# Patient Record
Sex: FEMALE | Race: WHITE | NOT HISPANIC OR LATINO | Employment: OTHER | URBAN - METROPOLITAN AREA
[De-identification: names, ages, dates, MRNs, and addresses within clinical notes are randomized per-mention and may not be internally consistent; named-entity substitution may affect disease eponyms.]

---

## 2019-08-22 ENCOUNTER — OFFICE VISIT (OUTPATIENT)
Dept: OPTOMETRY | Facility: CLINIC | Age: 64
End: 2019-08-22
Payer: COMMERCIAL

## 2019-08-22 DIAGNOSIS — H43.22 ASTEROID HYALOSIS OF LEFT EYE: ICD-10-CM

## 2019-08-22 DIAGNOSIS — H52.7 REFRACTIVE ERROR: ICD-10-CM

## 2019-08-22 DIAGNOSIS — H25.13 NUCLEAR SCLEROSIS OF BOTH EYES: Primary | ICD-10-CM

## 2019-08-22 PROCEDURE — 92015 PR REFRACTION: ICD-10-PCS | Mod: S$GLB,,, | Performed by: OPTOMETRIST

## 2019-08-22 PROCEDURE — 92004 COMPRE OPH EXAM NEW PT 1/>: CPT | Mod: S$GLB,,, | Performed by: OPTOMETRIST

## 2019-08-22 PROCEDURE — 92015 DETERMINE REFRACTIVE STATE: CPT | Mod: S$GLB,,, | Performed by: OPTOMETRIST

## 2019-08-22 PROCEDURE — 99999 PR PBB SHADOW E&M-NEW PATIENT-LVL II: CPT | Mod: PBBFAC,,, | Performed by: OPTOMETRIST

## 2019-08-22 PROCEDURE — 92004 PR EYE EXAM, NEW PATIENT,COMPREHESV: ICD-10-PCS | Mod: S$GLB,,, | Performed by: OPTOMETRIST

## 2019-08-22 PROCEDURE — 99999 PR PBB SHADOW E&M-NEW PATIENT-LVL II: ICD-10-PCS | Mod: PBBFAC,,, | Performed by: OPTOMETRIST

## 2019-08-22 RX ORDER — IBUPROFEN 200 MG
1 CAPSULE ORAL
COMMUNITY
End: 2020-02-27 | Stop reason: CLARIF

## 2019-08-22 RX ORDER — ATENOLOL 25 MG/1
25 TABLET ORAL DAILY
Refills: 0 | COMMUNITY
Start: 2019-06-10

## 2019-08-22 RX ORDER — OMEPRAZOLE 40 MG/1
40 CAPSULE, DELAYED RELEASE ORAL EVERY MORNING
Refills: 0 | COMMUNITY
Start: 2019-08-06 | End: 2020-02-27 | Stop reason: CLARIF

## 2019-08-22 RX ORDER — LORAZEPAM 1 MG/1
1 TABLET ORAL NIGHTLY PRN
Refills: 1 | COMMUNITY
Start: 2019-08-07

## 2019-08-22 RX ORDER — INFLIXIMAB 100 MG/10ML
INJECTION, POWDER, LYOPHILIZED, FOR SOLUTION INTRAVENOUS
Status: ON HOLD | COMMUNITY
End: 2020-03-11 | Stop reason: HOSPADM

## 2019-08-22 RX ORDER — PRAVASTATIN SODIUM 80 MG/1
80 TABLET ORAL NIGHTLY
Refills: 0 | COMMUNITY
Start: 2019-06-10

## 2019-08-22 RX ORDER — ZOLPIDEM TARTRATE 10 MG/1
10 TABLET ORAL NIGHTLY PRN
Refills: 1 | COMMUNITY
Start: 2019-08-07

## 2019-08-22 RX ORDER — DIPHENOXYLATE HYDROCHLORIDE AND ATROPINE SULFATE 2.5; .025 MG/1; MG/1
1 TABLET ORAL DAILY PRN
COMMUNITY

## 2019-08-22 RX ORDER — FENOFIBRATE 54 MG/1
54 TABLET ORAL 2 TIMES DAILY
Refills: 0 | COMMUNITY
Start: 2019-06-25

## 2019-08-22 RX ORDER — METFORMIN HYDROCHLORIDE 500 MG/1
1000 TABLET ORAL 2 TIMES DAILY WITH MEALS
COMMUNITY
Start: 2019-08-12

## 2019-08-22 RX ORDER — AZATHIOPRINE 50 MG/1
100 TABLET ORAL DAILY
COMMUNITY
Start: 2019-08-21

## 2019-08-22 RX ORDER — VENLAFAXINE HYDROCHLORIDE 150 MG/1
150 CAPSULE, EXTENDED RELEASE ORAL DAILY
Refills: 2 | COMMUNITY
Start: 2019-08-06

## 2019-08-22 NOTE — PROGRESS NOTES
HPI     New pt here for routine diabetic eye exam. Pt states last eye exam was   about 3 years ago. Pt has been noticing significant va changes since last   seen. She has noticed while reading she is struggling more. Denies eye   pain. Pt states high bp and diabetes are both controlled with meds. Pt has   noticed floaters in OU.     No results found for: HGBA1C      Last edited by García Banda on 8/22/2019  9:21 AM. (History)            Assessment /Plan     For exam results, see Encounter Report.    Nuclear sclerosis of both eyes    Asteroid hyalosis of left eye    Refractive error      1. Educated pt on presence of cataracts and effects on vision. No surgery at this time. Recheck in one year.  2. Monitor condition. Patient to report any changes. RTC 1 year recheck.  3. New Spec Rx given. Different lens options discussed with patient. RTC 1 year full exam.

## 2019-09-30 PROBLEM — K21.9 GERD (GASTROESOPHAGEAL REFLUX DISEASE): Status: ACTIVE | Noted: 2019-09-30

## 2020-02-11 DIAGNOSIS — M17.12 UNILATERAL PRIMARY OSTEOARTHRITIS, LEFT KNEE: Primary | ICD-10-CM

## 2020-02-13 ENCOUNTER — CLINICAL SUPPORT (OUTPATIENT)
Dept: REHABILITATION | Facility: HOSPITAL | Age: 65
End: 2020-02-13
Payer: COMMERCIAL

## 2020-02-13 DIAGNOSIS — M25.561 CHRONIC PAIN OF BOTH KNEES: ICD-10-CM

## 2020-02-13 DIAGNOSIS — R26.9 GAIT DIFFICULTY: ICD-10-CM

## 2020-02-13 DIAGNOSIS — G89.29 CHRONIC PAIN OF BOTH KNEES: ICD-10-CM

## 2020-02-13 DIAGNOSIS — M25.661 DECREASED RANGE OF MOTION OF BOTH KNEES: ICD-10-CM

## 2020-02-13 DIAGNOSIS — M17.12 UNILATERAL PRIMARY OSTEOARTHRITIS, LEFT KNEE: ICD-10-CM

## 2020-02-13 DIAGNOSIS — M25.662 DECREASED RANGE OF MOTION OF BOTH KNEES: ICD-10-CM

## 2020-02-13 DIAGNOSIS — M25.562 CHRONIC PAIN OF BOTH KNEES: ICD-10-CM

## 2020-02-13 PROCEDURE — 97162 PT EVAL MOD COMPLEX 30 MIN: CPT | Mod: PO

## 2020-02-13 PROCEDURE — 97110 THERAPEUTIC EXERCISES: CPT | Mod: PO

## 2020-02-13 NOTE — PLAN OF CARE
[facility]  Physical Therapy Initial Evaluation    Name: Dedra Mcclain  Clinic Number: [id number]    Therapy Diagnosis:   Encounter Diagnoses   Name Primary?    Unilateral primary osteoarthritis, left knee     Chronic pain of both knees     Decreased range of motion of both knees     Gait difficulty      Physician: LA Miramontes MD    Physician Orders: PT Eval and Treat  Medical Diagnosis from Referral: Unilateral primary osteoarthritis, left knee   Evaluation Date: 2/13/2020  Authorization Period Expiration: 12/31/2020   Plan of Care Expiration: 03/06/2020  Visit # / Visits authorized: 1/ 20    Time In: 1103  Time Out: 1156  Total Billable Time: 53 minutes    Precautions: Standard    Subjective   Date of onset: Chronic, scheduled for 3/10/2020  History of current condition - [place] reports: She reports she is having a (L) TKA scheduled for March 10th, 2020. She reports she has been having chronic knee pain for 15 years. She has had cortisone injections and 2 synvisc shots in the (R) and 1 in the (L). She reports her (L) knee is more painful than the (R), but the (R) knee is weaker. She reports one fall about 5 years ago when she tripped over the vacuum  cord. She states she has been furniture walking at home. (Instructe patient to use SPC). She reports her MD wants her to strengthen her LEs prior to surgery in March. [place] reports significant difficulty with stepping up onto the curb, stairs, and walking.      Medical History:   Past Medical History:   Diagnosis Date    Arthritis     Diabetes mellitus     Foot injury     Hypertension        Surgical History:   Dedra Mcclain  has a past surgical history that includes Gallbladder surgery; Hernia repair; Tubal ligation; Appendectomy; Lung biopsy; Esophagogastroduodenoscopy (N/A, 9/30/2019); and Esophageal dilation (N/A, 9/30/2019).    Medications:   [place]ia has a current medication list which includes the  following prescription(s): atenolol, azathioprine, calcium citrate, diphenoxylate-atropine 2.5-0.025 mg, fenofibrate, infliximab, levocetirizine, lorazepam, metformin, omeprazole, pravastatin, venlafaxine, and zolpidem.    Allergies:   Review of patient's allergies indicates:   Allergen Reactions    Morphine Hives and Itching        Imaging, X-rays: not in chart yet    Prior Therapy: Yes, for her shoulder. Never for her knee  Social History: Lives with her , SSH no steps, two walk in showers with a seat, has handicap bathroom set up   Occupation: Disability for Ulcerative Colitis, enjoys fishing, read  Prior Level of Function: Progressively worsening knee pain with ADLs, walking, standing, stairs  Current Level of Function: Cannot perform her ADLs, gardening, yardwork to the point where she is scheduling a (L) TKA    Pain:  Current 0/10, worst 10/10, best 0/10   Location: bilateral knee   Description: Aching and Burning  Aggravating Factors: Standing, Walking and Getting out of bed/chair  Easing Factors: rest and elevation    Pts goals: To strengthen legs pre-surgery     Objective     Observation: Patient in no acute distress    Posture: Stands with (B) increased knee flexion      Range of Motion:   Knee Left active Left Passive Right Active R passive   Flexion 116  NT   Extension -14 NT -5 NT       Lower Extremity Strength  Right LE  Left LE    Knee extension: 4+/5 Knee extension: 4+/5   Knee flexion: 4+/5 Knee flexion: 4+/5   Hip flexion: 4+/5 Hip flexion: 4+/5   Hip extension:  4+/5 Hip extension: 4/5   Hip abduction: 4/5 Hip abduction: 4-/5   Hip adduction: 4/5 Hip adduction 4/5   Ankle dorsiflexion: 4+/5 Ankle dorsiflexion: 4+/5   Ankle plantarflexion: 4+/5 Ankle plantarflexion: 4+/5           Special Tests: NT, patient scheduled for TKA and here for strengthening     Function:    - SLS R: 3s  - SLS L: 2s  - Squat: NT due to pain   - Sit <--> Stand:Independent   - Bed Mobility:  Independent        Joint Mobility: Hypomobile  Patellar (B)     Sensation: Intact to LT (B) LEs    Flexibility:   Ely's test: R = Mod limited ; L = Highly limited   Hamstring: R = Mod limited ; L = Mod limited      CMS Impairment/Limitation/Restriction for FOTO Knee Survey    Therapist reviewed FOTO scores for Dedra Mcclain on 2/13/2020.   FOTO documents entered into [employer] - see Media section.    Limitation Score: 68%  Category: Mobility         TREATMENT   Treatment Time In: 1135  Treatment Time Out: 1155  Total Treatment time separate from Evaluation: 11 minutes    [place] received therapeutic exercises to develop strength and ROM for 20 minutes including:  Quad set x 10 (3 second hold)  SLR x 10  SL hip abduction x 10  SL hip adduction x 10   Prone hip extension x 10   Clamshells GTB 2 x 10       Home Exercises and Patient Education Provided    Education provided:   - Role of PT, PT POC    Written Home Exercises Provided: yes.  Exercises were reviewed and [place] was able to demonstrate them prior to the end of the session.  [place]ia demonstrated good  understanding of the education provided.     See EMR under Media for exercises provided 2/13/2020.    Assessment   Virginia is a 64 y.o. female referred to outpatient Physical Therapy with a medical diagnosis of Unilateral primary osteoarthritis, left knee. Physical exam is consistent with (B) knee OA leading to decreased knee ROM and gait difficulty. Pt is scheduled for strengthening prior to knee replacement surgery. Primary impairments include AROM, PROM, joint mobility, strength, balance, soft tissue restrictions, gait, and pain which limits functional mobility. This pt is a good candidate for skilled PT tx and stands to benefit from a combination of manual therapy including joint mobilizations with trigger point/myofacscial release, therapeutic exercise to establish core/joint stability, neuromuscular re-education, and modalities Prn. The pt has been  educated on their dx/POC and consents to further PT tx.    Pt prognosis is Good.   Pt will benefit from skilled outpatient Physical Therapy to address the deficits stated above and in the chart below, provide pt/family education, and to maximize pt's level of independence.     Plan of care discussed with patient: Yes  Pt's spiritual, cultural and educational needs considered and patient is agreeable to the plan of care and goals as stated below:     Anticipated Barriers for therapy: Knee pain limiting ability to participate with exercises    Medical Necessity is demonstrated by the following  History  Co-morbidities and personal factors that may impact the plan of care Co-morbidities:   anxiety, diabetes, high BMI, HTN and ulcerative colitis    Personal Factors:   age  lifestyle     moderate   Examination  Body Structures and Functions, activity limitations and participation restrictions that may impact the plan of care Body Regions:   lower extremities    Body Systems:    ROM  strength  gross coordinated movement  balance  gait    Participation Restrictions:   Cannot exercise, pain with walking, ADLs    Activity limitations:   Learning and applying knowledge  no deficits    General Tasks and Commands  no deficits    Communication  no deficits    Mobility  lifting and carrying objects  walking    Self care  dressing    Domestic Life  shopping  doing house work (cleaning house, washing dishes, laundry)    Interactions/Relationships  no deficits    Life Areas  no deficits    Community and Social Life  community life  recreation and leisure         moderate   Clinical Presentation evolving clinical presentation with changing clinical characteristics moderate   Decision Making/ Complexity Score: moderate     Goals:  Short-Term Goals: 2 weeks  - The patient will be independent with initial home exercise program.  - The patient will increase strength to at least 4/5 to perform functional mobility including ambulation to  prepare for total knee replacement.    Long-Term Goals: 4 weeks  - Pt to achieve <53% limitation as measured by the FOTO to demonstrate decreased disability.  - The patient will increase strength to at least 4+/5 to perform functional mobility including ascending/descending stairs in preparation for total replacement.   - The patient will increase knee ROM grossly to 5 to 120 to perform ambulation with pain < 3/10.  - The patient will demo safe transfers and ambulation with RW in preparation for TKA.     Plan   Plan of care Certification: 2/13/2020 to 03/06/2020.    Outpatient Physical Therapy 2 times weekly for 3 weeks to include the following interventions: Gait Training, Manual Therapy, Moist Heat/ Ice, Neuromuscular Re-ed, Patient Education, Therapeutic Activites and Therapeutic Exercise.     Bar Hernandez, PT

## 2020-02-14 NOTE — PROGRESS NOTES
Physical Therapy Daily Treatment Note     Name: Dedra Mcclain  Clinic Number: [id number]    Therapy Diagnosis:   Encounter Diagnoses   Name Primary?    Chronic pain of both knees     Decreased range of motion of both knees     Gait difficulty      Physician: LA Miramontes MD    Visit Date: 2/17/2020  Physician Orders: PT Eval and Treat  Medical Diagnosis from Referral: Unilateral primary osteoarthritis, left knee   Evaluation Date: 2/13/2020  Authorization Period Expiration: 12/31/2020   Plan of Care Expiration: 03/06/2020  Visit # / Visits authorized: 2/ 20    Time In: 1108  Time Out: 1200  Total Billable Time: 26 minutes    Precautions: Standard    Subjective     Pt reports: Compliance with her HEP with no exacerbations of her pain.  She was compliant with home exercise program.  Response to previous treatment: No adverse effect  Functional change: Too soon to tell    Pain: 3/10  Location: left knee      Objective     [place] received therapeutic exercises to develop strength and ROM for 52 minutes including:  Upright bike x 5 min  Gastroc stretch on incline x 3 minutes  Quad set 2 x 10 (3 second hold)  SLR 2 x 10  SL hip abduction 2 x 10 1#  Prone hip extension 2 x 10 1#  Clamshells GTB 2 x 10     Supine hip flexor stretch with strap x 2 minutes  Leg Press 2 x 10 20#  LAQ 2 x 10 2#    Home Exercises Provided and Patient Education Provided     Education provided:   - Continuing HEP    Written Home Exercises Provided: yes.  Exercises were reviewed and [place] was able to demonstrate them prior to the end of the session.  [place]ia demonstrated good  understanding of the education provided.     See EMR under Media for exercises provided 2/17/2020.    Assessment     Virginia tolerated treatment very well with no increase in pain with strengthening exercises. She remains weak and fatigues quickly with her exercises and will continue to benefit from LE strengthening and ROM exercises prior to her knee  replacement.     Virginia is progressing well towards her goals.   Pt prognosis is Good.     Pt will continue to benefit from skilled outpatient physical therapy to address the deficits listed in the problem list box on initial evaluation, provide pt/family education and to maximize pt's level of independence in the home and community environment.     Pt's spiritual, cultural and educational needs considered and pt agreeable to plan of care and goals.    Anticipated barriers to physical therapy: None    Goals:   Short-Term Goals: 2 weeks  - The patient will be independent with initial home exercise program.  - The patient will increase strength to at least 4/5 to perform functional mobility including ambulation to prepare for total knee replacement.     Long-Term Goals: 4 weeks  - Pt to achieve <53% limitation as measured by the FOTO to demonstrate decreased disability.  - The patient will increase strength to at least 4+/5 to perform functional mobility including ascending/descending stairs in preparation for total replacement.   - The patient will increase knee ROM grossly to 5 to 120 to perform ambulation with pain < 3/10.  - The patient will demo safe transfers and ambulation with RW in preparation for TKA.     Plan     Continue with LE strengthening    Bar Hernandez, PT

## 2020-02-17 ENCOUNTER — CLINICAL SUPPORT (OUTPATIENT)
Dept: REHABILITATION | Facility: HOSPITAL | Age: 65
End: 2020-02-17
Payer: COMMERCIAL

## 2020-02-17 DIAGNOSIS — M25.661 DECREASED RANGE OF MOTION OF BOTH KNEES: ICD-10-CM

## 2020-02-17 DIAGNOSIS — M25.562 CHRONIC PAIN OF BOTH KNEES: ICD-10-CM

## 2020-02-17 DIAGNOSIS — M25.561 CHRONIC PAIN OF BOTH KNEES: ICD-10-CM

## 2020-02-17 DIAGNOSIS — M25.662 DECREASED RANGE OF MOTION OF BOTH KNEES: ICD-10-CM

## 2020-02-17 DIAGNOSIS — R26.9 GAIT DIFFICULTY: ICD-10-CM

## 2020-02-17 DIAGNOSIS — G89.29 CHRONIC PAIN OF BOTH KNEES: ICD-10-CM

## 2020-02-17 PROCEDURE — 97110 THERAPEUTIC EXERCISES: CPT | Mod: PO

## 2020-02-17 NOTE — PROGRESS NOTES
Physical Therapy Daily Treatment Note     Name: Dedra Mcclain  Clinic Number: [id number]    Therapy Diagnosis:   Encounter Diagnoses   Name Primary?    Chronic pain of both knees     Decreased range of motion of both knees     Gait difficulty      Physician: LA Miramontse MD    Visit Date: 2/20/2020  Physician Orders: PT Eval and Treat  Medical Diagnosis from Referral: Unilateral primary osteoarthritis, left knee   Evaluation Date: 2/13/2020  Authorization Period Expiration: 12/31/2020   Plan of Care Expiration: 03/06/2020  Visit # / Visits authorized: 3/ 20    Time In: 1158  Time Out: 1258  Total Billable Time: 60 minutes    Precautions: Standard    Subjective      Pt reports: She had increased muscle soreness following treatment earlier this week.   She was compliant with home exercise program.  Response to previous treatment: No adverse effect  Functional change: Too soon to tell    Pain: 3/10  Location: left knee      Objective     [place] received therapeutic exercises to develop strength and ROM for 60 minutes including:  Upright bike x 5 min  Gastroc stretch on incline x 3 minutes  Quad set 2 x 10 (3 second hold)  SLR 2 x 10 1#  SL hip abduction 2 x 10 1#  Prone hip extension 2 x 10 1#  Clamshells GTB 2 x 10     Supine hip flexor stretch with strap x 2 minutes  Leg Press 2 x 10 20#  LAQ 2 x 10 2#  HS curls 2 x 10 GTB    Standing hip abduction 2 x 10 YTB  Standing hip extension 2 x 10 YTB  Tandem stance 2 x 30s with (B) 1 finger support     Home Exercises Provided and Patient Education Provided     Education provided:   - Continuing HEP    Written Home Exercises Provided: yes.  Exercises were reviewed and [place] was able to demonstrate them prior to the end of the session.  [place]ia demonstrated good  understanding of the education provided.     See EMR under Media for exercises provided 2/17/2020.    Assessment     [place]ia continues to tolerate progression of LE strengthening exercises very well  with no exacerbations of knee pain. Fatigues quickly with standing hip exercises and requires UE support due to impaired balance. Appropriate challenge with tandem stance and UE 1 finger support. Will continue with LE strengthening prior to surgery.     [place]ia is progressing well towards her goals.   Pt prognosis is Good.     Pt will continue to benefit from skilled outpatient physical therapy to address the deficits listed in the problem list box on initial evaluation, provide pt/family education and to maximize pt's level of independence in the home and community environment.     Pt's spiritual, cultural and educational needs considered and pt agreeable to plan of care and goals.    Anticipated barriers to physical therapy: None    Goals:   Short-Term Goals: 2 weeks  - The patient will be independent with initial home exercise program.  - The patient will increase strength to at least 4/5 to perform functional mobility including ambulation to prepare for total knee replacement.     Long-Term Goals: 4 weeks  - Pt to achieve <53% limitation as measured by the FOTO to demonstrate decreased disability.  - The patient will increase strength to at least 4+/5 to perform functional mobility including ascending/descending stairs in preparation for total replacement.   - The patient will increase knee ROM grossly to 5 to 120 to perform ambulation with pain < 3/10.  - The patient will demo safe transfers and ambulation with RW in preparation for TKA.     Plan     Continue with LE strengthening    Bar Hernandez, PT

## 2020-02-20 ENCOUNTER — CLINICAL SUPPORT (OUTPATIENT)
Dept: REHABILITATION | Facility: HOSPITAL | Age: 65
End: 2020-02-20
Payer: COMMERCIAL

## 2020-02-20 DIAGNOSIS — M25.661 DECREASED RANGE OF MOTION OF BOTH KNEES: ICD-10-CM

## 2020-02-20 DIAGNOSIS — M25.562 CHRONIC PAIN OF BOTH KNEES: ICD-10-CM

## 2020-02-20 DIAGNOSIS — M25.662 DECREASED RANGE OF MOTION OF BOTH KNEES: ICD-10-CM

## 2020-02-20 DIAGNOSIS — G89.29 CHRONIC PAIN OF BOTH KNEES: ICD-10-CM

## 2020-02-20 DIAGNOSIS — R26.9 GAIT DIFFICULTY: ICD-10-CM

## 2020-02-20 DIAGNOSIS — M25.561 CHRONIC PAIN OF BOTH KNEES: ICD-10-CM

## 2020-02-20 PROCEDURE — 97110 THERAPEUTIC EXERCISES: CPT | Mod: PO

## 2020-02-24 ENCOUNTER — CLINICAL SUPPORT (OUTPATIENT)
Dept: REHABILITATION | Facility: HOSPITAL | Age: 65
End: 2020-02-24
Payer: COMMERCIAL

## 2020-02-24 DIAGNOSIS — M25.662 DECREASED RANGE OF MOTION OF BOTH KNEES: ICD-10-CM

## 2020-02-24 DIAGNOSIS — R26.9 GAIT DIFFICULTY: ICD-10-CM

## 2020-02-24 DIAGNOSIS — M25.661 DECREASED RANGE OF MOTION OF BOTH KNEES: ICD-10-CM

## 2020-02-24 DIAGNOSIS — M25.561 CHRONIC PAIN OF BOTH KNEES: ICD-10-CM

## 2020-02-24 DIAGNOSIS — M25.562 CHRONIC PAIN OF BOTH KNEES: ICD-10-CM

## 2020-02-24 DIAGNOSIS — G89.29 CHRONIC PAIN OF BOTH KNEES: ICD-10-CM

## 2020-02-24 PROCEDURE — 97110 THERAPEUTIC EXERCISES: CPT | Mod: PO

## 2020-02-24 NOTE — PROGRESS NOTES
Physical Therapy Daily Treatment Note     Name: Dedra Mcclain  Clinic Number: [id number]    Therapy Diagnosis:   Encounter Diagnoses   Name Primary?    Chronic pain of both knees     Decreased range of motion of both knees     Gait difficulty      Physician: LA Miramontes MD    Visit Date: 2/24/2020  Physician Orders: PT Eval and Treat  Medical Diagnosis from Referral: Unilateral primary osteoarthritis, left knee   Evaluation Date: 2/13/2020  Authorization Period Expiration: 12/31/2020   Plan of Care Expiration: 03/06/2020  Visit # / Visits authorized: 4/ 20    Time In: 1200  Time Out: 1255  Total Billable Time: 27 minutes    Precautions: Standard    Subjective      Pt reports: She had lateral quad pain after her visit last week.   She was compliant with home exercise program.  Response to previous treatment: No adverse effect  Functional change: Too soon to tell    Pain: 3/10  Location: left knee      Objective     BP taken seated (L) after exercises: 149/97    [place] received therapeutic exercises to develop strength and ROM for 55 minutes including:  Upright bike x 5 min  Gastroc stretch on incline x 3 minutes  Quad set 2 x 10 (3 second hold)  SLR 2 x 10 1#  SL hip abduction 2 x 10 1#  Prone hip extension 2 x 10 1#  Clamshells GTB 2 x 10     Supine hip flexor stretch with strap x 2 minutes  Leg Press 2 x 10 20#  LAQ 2 x 10 2#  HS curls 2 x 10 GTB    Standing hip abduction 2 x 10 YTB - NP  Standing hip extension 2 x 10 YTB - NP  Tandem stance 2 x 30s with (B) 1 finger support - NP    Home Exercises Provided and Patient Education Provided     Education provided:   - Continuing HEP    Written Home Exercises Provided: yes.  Exercises were reviewed and [place] was able to demonstrate them prior to the end of the session.  [place] demonstrated good  understanding of the education provided.     See EMR under Media for exercises provided 2/17/2020.    Assessment     [place]ia reported lateral leg soreness  and dizziness with treatment today. BP was 149/97 while sitting EOM and patient's symptoms of dizziness resolved after prolonged sitting. Held standing exercises due to her elevated BP. Will continue to progress as tolerated.     [place]ia is progressing well towards her goals.   Pt prognosis is Good.     Pt will continue to benefit from skilled outpatient physical therapy to address the deficits listed in the problem list box on initial evaluation, provide pt/family education and to maximize pt's level of independence in the home and community environment.     Pt's spiritual, cultural and educational needs considered and pt agreeable to plan of care and goals.    Anticipated barriers to physical therapy: None    Goals:   Short-Term Goals: 2 weeks  - The patient will be independent with initial home exercise program.  - The patient will increase strength to at least 4/5 to perform functional mobility including ambulation to prepare for total knee replacement.     Long-Term Goals: 4 weeks  - Pt to achieve <53% limitation as measured by the FOTO to demonstrate decreased disability.  - The patient will increase strength to at least 4+/5 to perform functional mobility including ascending/descending stairs in preparation for total replacement.   - The patient will increase knee ROM grossly to 5 to 120 to perform ambulation with pain < 3/10.  - The patient will demo safe transfers and ambulation with RW in preparation for TKA.     Plan     Continue with LE strengthening    Bar Hernandez, PT

## 2020-02-27 ENCOUNTER — CLINICAL SUPPORT (OUTPATIENT)
Dept: REHABILITATION | Facility: HOSPITAL | Age: 65
End: 2020-02-27
Payer: COMMERCIAL

## 2020-02-27 DIAGNOSIS — M25.562 CHRONIC PAIN OF BOTH KNEES: ICD-10-CM

## 2020-02-27 DIAGNOSIS — M25.561 CHRONIC PAIN OF BOTH KNEES: ICD-10-CM

## 2020-02-27 DIAGNOSIS — M25.661 DECREASED RANGE OF MOTION OF BOTH KNEES: ICD-10-CM

## 2020-02-27 DIAGNOSIS — R26.9 GAIT DIFFICULTY: ICD-10-CM

## 2020-02-27 DIAGNOSIS — G89.29 CHRONIC PAIN OF BOTH KNEES: ICD-10-CM

## 2020-02-27 DIAGNOSIS — M25.662 DECREASED RANGE OF MOTION OF BOTH KNEES: ICD-10-CM

## 2020-02-27 PROCEDURE — 97140 MANUAL THERAPY 1/> REGIONS: CPT | Mod: PO

## 2020-02-27 PROCEDURE — 97110 THERAPEUTIC EXERCISES: CPT | Mod: PO

## 2020-02-27 NOTE — PROGRESS NOTES
Physical Therapy Daily Treatment Note     Name: Dedra Mcclain  Clinic Number: [id number]    Therapy Diagnosis:   Encounter Diagnoses   Name Primary?    Chronic pain of both knees     Decreased range of motion of both knees     Gait difficulty      Physician: LA Miramontes MD    Visit Date: 2/27/2020  Physician Orders: PT Eval and Treat  Medical Diagnosis from Referral: Unilateral primary osteoarthritis, left knee   Evaluation Date: 2/13/2020  Authorization Period Expiration: 12/31/2020   Plan of Care Expiration: 03/06/2020  Visit # / Visits authorized: 5/ 20    Time In: 1500  Time Out: 1600  Total Billable Time: 60 minutes    Precautions: Standard    Subjective      Pt reports: She stated her insurance might not approve her surgery, which has her frustrated. She reports she is still having lateral quad soreness.  She was compliant with home exercise program.  Response to previous treatment: No adverse effect  Functional change: Too soon to tell    Pain: 3/10  Location: left knee      Objective       [place] received therapeutic exercises to develop strength and ROM for 50 minutes including:  Upright bike x 5 min  Gastroc stretch on incline x 3 minutes  Quad set 2 x 10 (3 second hold)  SLR 2 x 10 1#  SL hip abduction 2 x 10 1#  Prone hip extension 2 x 10 1#  Clamshells GTB 2 x 10     Supine hip flexor stretch with strap x 2 minutes  Leg Press 2 x 10 20#  LAQ 2 x 10 2#  HS curls 2 x 10 GTB    Standing hip abduction 2 x 10 YTB  Standing hip extension 2 x 10 YTB   Tandem stance 2 x 30s with (B) 1 finger support     [place] received the following manual therapy techniques: Soft tissue Mobilization were applied to the: (R) lateral quad for 10 minutes, including:  IASTM to (R) VL and ITB       Home Exercises Provided and Patient Education Provided     Education provided:   - Continuing HEP    Written Home Exercises Provided: yes.  Exercises were reviewed and [place] was able to demonstrate them prior to the  end of the session.  [place]ia demonstrated good  understanding of the education provided.     See EMR under Media for exercises provided 2/17/2020.    Assessment     [place]ia performed all exercises with no symptoms of dizziness or lightheadedness. She is continuing to progress with her hip and quad strengthening in preparation for her surgery on 3/10/2020. Good response to IASTM to (R) quad today with appropriate erythema and less pain following treatment.     [place]ia is progressing well towards her goals.   Pt prognosis is Good.     Pt will continue to benefit from skilled outpatient physical therapy to address the deficits listed in the problem list box on initial evaluation, provide pt/family education and to maximize pt's level of independence in the home and community environment.     Pt's spiritual, cultural and educational needs considered and pt agreeable to plan of care and goals.    Anticipated barriers to physical therapy: None    Goals:   Short-Term Goals: 2 weeks  - The patient will be independent with initial home exercise program.  - The patient will increase strength to at least 4/5 to perform functional mobility including ambulation to prepare for total knee replacement.     Long-Term Goals: 4 weeks  - Pt to achieve <53% limitation as measured by the FOTO to demonstrate decreased disability.  - The patient will increase strength to at least 4+/5 to perform functional mobility including ascending/descending stairs in preparation for total replacement.   - The patient will increase knee ROM grossly to 5 to 120 to perform ambulation with pain < 3/10.  - The patient will demo safe transfers and ambulation with RW in preparation for TKA.     Plan     Continue with LE strengthening    Bar Hernandez, PT

## 2020-02-28 ENCOUNTER — HOSPITAL ENCOUNTER (OUTPATIENT)
Dept: RADIOLOGY | Facility: HOSPITAL | Age: 65
Discharge: HOME | End: 2020-02-28
Attending: SURGERY
Payer: COMMERCIAL

## 2020-02-28 ENCOUNTER — HOSPITAL ENCOUNTER (OUTPATIENT)
Dept: RADIOLOGY | Facility: HOSPITAL | Age: 65
Discharge: HOME | End: 2020-02-28
Attending: SURGERY
Payer: MEDICARE

## 2020-02-28 DIAGNOSIS — M81.0 OSTEOPOROSIS: ICD-10-CM

## 2020-02-28 DIAGNOSIS — Z12.31 VISIT FOR SCREENING MAMMOGRAM: ICD-10-CM

## 2020-02-28 PROCEDURE — 77067 SCR MAMMO BI INCL CAD: CPT | Mod: TC,PO

## 2020-02-28 PROCEDURE — 77080 DXA BONE DENSITY AXIAL: CPT | Mod: TC,PO

## 2020-02-28 PROCEDURE — 77067 MAMMO DIGITAL SCREENING BILAT WITH TOMOSYNTHESIS_CAD: ICD-10-PCS | Mod: 26,,, | Performed by: RADIOLOGY

## 2020-02-28 PROCEDURE — 77063 MAMMO DIGITAL SCREENING BILAT WITH TOMOSYNTHESIS_CAD: ICD-10-PCS | Mod: 26,,, | Performed by: RADIOLOGY

## 2020-02-28 PROCEDURE — 77080 DXA BONE DENSITY AXIAL: CPT | Mod: 26,,, | Performed by: RADIOLOGY

## 2020-02-28 PROCEDURE — 77067 SCR MAMMO BI INCL CAD: CPT | Mod: 26,,, | Performed by: RADIOLOGY

## 2020-02-28 PROCEDURE — 77063 BREAST TOMOSYNTHESIS BI: CPT | Mod: 26,,, | Performed by: RADIOLOGY

## 2020-02-28 PROCEDURE — 77080 DEXA BONE DENSITY SPINE HIP: ICD-10-PCS | Mod: 26,,, | Performed by: RADIOLOGY

## 2020-02-28 NOTE — PROGRESS NOTES
Physical Therapy Daily Treatment Note     Name: Dedra Mcclain  Clinic Number: [id number]    Therapy Diagnosis:   Encounter Diagnoses   Name Primary?    Chronic pain of both knees     Decreased range of motion of both knees     Gait difficulty      Physician: LA Miramontes MD    Visit Date: 3/2/2020  Physician Orders: PT Eval and Treat  Medical Diagnosis from Referral: Unilateral primary osteoarthritis, left knee   Evaluation Date: 2/13/2020  Authorization Period Expiration: 12/31/2020   Plan of Care Expiration: 03/06/2020  Visit # / Visits authorized: 6/ 20    Time In: 1200  Time Out: 1258  Total Billable Time: 58 minutes    Precautions: Standard    Subjective      Pt reports: She stated her insurance might not approve her surgery, which has her frustrated. She reports she is still having lateral quad soreness.  She was compliant with home exercise program.  Response to previous treatment: No adverse effect  Functional change: Too soon to tell    Pain: 3/10  Location: left knee      Objective       Virginia received therapeutic exercises to develop strength and ROM for 50 minutes including:  Upright bike x 5 min  Gastroc stretch on incline x 3 minutes  Quad set 2 x 10 (3 second hold)  SLR 2 x 10 2#  SL hip abduction 2 x 10 2#  Prone hip extension 2 x 10 2#  Clamshells BTB 2 x 10     Supine hip flexor stretch with strap x 2 minutes  Leg Press 2 x 10 30#  LAQ 2 x 10 2#  HS curls 2 x 10 BTB    Standing hip abduction 2 x 10 YTB  Standing hip extension 2 x 10 YTB   Tandem stance 2 x 30s with (B) 1 finger support     [place]ia received the following manual therapy techniques: Soft tissue Mobilization were applied to the: (R) lateral quad for 8 minutes, including:  IASTM to (R) VL and ITB       Home Exercises Provided and Patient Education Provided     Education provided:   - Continuing HEP    Written Home Exercises Provided: yes.  Exercises were reviewed and [place] was able to demonstrate them prior to the  end of the session.  Virginia demonstrated good  understanding of the education provided.     See EMR under Media for exercises provided 2/17/2020.    Assessment     Virginia had increased TTP and soreness at (R) VL and ITB, which improved following IASTM. Progressed with her resistance for LE exercises, which she tolerated well with no exacerbations of pain. Will see for one more visit prior to her knee surgery next week.      Virginia is progressing well towards her goals.   Pt prognosis is Good.     Pt will continue to benefit from skilled outpatient physical therapy to address the deficits listed in the problem list box on initial evaluation, provide pt/family education and to maximize pt's level of independence in the home and community environment.     Pt's spiritual, cultural and educational needs considered and pt agreeable to plan of care and goals.    Anticipated barriers to physical therapy: None    Goals:   Short-Term Goals: 2 weeks  - The patient will be independent with initial home exercise program.  - The patient will increase strength to at least 4/5 to perform functional mobility including ambulation to prepare for total knee replacement.     Long-Term Goals: 4 weeks  - Pt to achieve <53% limitation as measured by the FOTO to demonstrate decreased disability.  - The patient will increase strength to at least 4+/5 to perform functional mobility including ascending/descending stairs in preparation for total replacement.   - The patient will increase knee ROM grossly to 5 to 120 to perform ambulation with pain < 3/10.  - The patient will demo safe transfers and ambulation with RW in preparation for TKA.     Plan     Continue with LE strengthening    Bar Hernandez, PT

## 2020-03-02 ENCOUNTER — CLINICAL SUPPORT (OUTPATIENT)
Dept: REHABILITATION | Facility: HOSPITAL | Age: 65
End: 2020-03-02
Payer: COMMERCIAL

## 2020-03-02 DIAGNOSIS — M25.661 DECREASED RANGE OF MOTION OF BOTH KNEES: ICD-10-CM

## 2020-03-02 DIAGNOSIS — G89.29 CHRONIC PAIN OF BOTH KNEES: ICD-10-CM

## 2020-03-02 DIAGNOSIS — R26.9 GAIT DIFFICULTY: ICD-10-CM

## 2020-03-02 DIAGNOSIS — M25.562 CHRONIC PAIN OF BOTH KNEES: ICD-10-CM

## 2020-03-02 DIAGNOSIS — M25.561 CHRONIC PAIN OF BOTH KNEES: ICD-10-CM

## 2020-03-02 DIAGNOSIS — M25.662 DECREASED RANGE OF MOTION OF BOTH KNEES: ICD-10-CM

## 2020-03-02 PROCEDURE — 97110 THERAPEUTIC EXERCISES: CPT | Mod: PO

## 2020-03-03 NOTE — PROGRESS NOTES
Physical Therapy Daily Treatment Note     Name: Dedra Mcclain  Clinic Number: [id number]    Therapy Diagnosis:   Encounter Diagnoses   Name Primary?    Chronic pain of both knees     Decreased range of motion of both knees     Gait difficulty      Physician: LA Miramontes MD    Visit Date: 3/6/2020  Physician Orders: PT Eval and Treat  Medical Diagnosis from Referral: Unilateral primary osteoarthritis, left knee   Evaluation Date: 2/13/2020  Authorization Period Expiration: 12/31/2020   Plan of Care Expiration: 03/06/2020  Visit # / Visits authorized: 7/ 20    Time In: 1100  Time Out: 1149  Total Billable Time: 49 minutes    Precautions: Standard    Subjective      Pt reports: She has done a lot of walking this week and has been having increased low back and knee pain. Requests less resistance with exercises due to muscle soreness.   She was compliant with home exercise program.  Response to previous treatment: No adverse effect  Functional change: Too soon to tell    Pain: 3/10  Location: left knee      Objective       [place]ia received therapeutic exercises to develop strength and ROM for 50 minutes including:  Upright bike x 5 min  Gastroc stretch on incline x 3 minutes  Quad set 2 x 10 (3 second hold)  SLR 2 x 10   SL hip abduction 2 x 10   Prone hip extension 2 x 10  Clamshells BTB 2 x 10     Supine hip flexor stretch with strap x 2 minutes  Leg Press 2 x 10 30#  LAQ 2 x 10  HS curls 2 x 10 BTB    Standing hip abduction 2 x 10 YTB  Standing hip extension 2 x 10 YTB   Tandem stance 2 x 30s with (B) 1 finger support     [place]ia participated in dynamic functional therapeutic activities to improve functional performance for 4 minutes, including:  RW training for transfers/gait         Home Exercises Provided and Patient Education Provided     Education provided:   - Continuing HEP    Written Home Exercises Provided: yes.  Exercises were reviewed and [place] was able to demonstrate them prior to the  end of the session.  [place]ia demonstrated good  understanding of the education provided.     See EMR under Media for exercises provided 2/17/2020.    Assessment     Nichole has progressed well with her knee ROM and LE strength prior to her (L) TKA. Reduced resistance due to increased knee soreness today and instructed patient on safety with RW use for transfers and ambulation. She is discharged from PT.     Virginia is progressing well towards her goals.   Pt prognosis is Good.     Pt will continue to benefit from skilled outpatient physical therapy to address the deficits listed in the problem list box on initial evaluation, provide pt/family education and to maximize pt's level of independence in the home and community environment.     Pt's spiritual, cultural and educational needs considered and pt agreeable to plan of care and goals.    Anticipated barriers to physical therapy: None    Goals:   Short-Term Goals: 2 weeks  - The patient will be independent with initial home exercise program.(Met)  - The patient will increase strength to at least 4/5 to perform functional mobility including ambulation to prepare for total knee replacement. (Met)    Long-Term Goals: 4 weeks  - Pt to achieve <53% limitation as measured by the FOTO to demonstrate decreased disability. (Progressing, not met)  - The patient will increase strength to at least 4+/5 to perform functional mobility including ascending/descending stairs in preparation for total replacement. (Progressing, not met)  - The patient will increase knee ROM grossly to 5 to 120 to perform ambulation with pain < 3/10. (Progressing, not met)   - The patient will demo safe transfers and ambulation with RW in preparation for TKA. (Met)      Plan     Discharge from PT    Bar Hernandez, PT

## 2020-03-06 ENCOUNTER — CLINICAL SUPPORT (OUTPATIENT)
Dept: REHABILITATION | Facility: HOSPITAL | Age: 65
End: 2020-03-06
Payer: COMMERCIAL

## 2020-03-06 DIAGNOSIS — G89.29 CHRONIC PAIN OF BOTH KNEES: ICD-10-CM

## 2020-03-06 DIAGNOSIS — M25.662 DECREASED RANGE OF MOTION OF BOTH KNEES: ICD-10-CM

## 2020-03-06 DIAGNOSIS — R26.9 GAIT DIFFICULTY: ICD-10-CM

## 2020-03-06 DIAGNOSIS — M25.562 CHRONIC PAIN OF BOTH KNEES: ICD-10-CM

## 2020-03-06 DIAGNOSIS — M25.661 DECREASED RANGE OF MOTION OF BOTH KNEES: ICD-10-CM

## 2020-03-06 DIAGNOSIS — M25.561 CHRONIC PAIN OF BOTH KNEES: ICD-10-CM

## 2020-03-06 PROCEDURE — 97110 THERAPEUTIC EXERCISES: CPT | Mod: PO

## 2020-03-06 NOTE — PLAN OF CARE
[facility]  Physical Therapy Reassessment    Name: Dedra Mcclain  Clinic Number: [id number]    Therapy Diagnosis:   Encounter Diagnoses   Name Primary?    Chronic pain of both knees     Decreased range of motion of both knees     Gait difficulty      Physician: LA Miramontes MD    Visit Date: 3/6/2020  Physician Orders: PT Eval and Treat  Medical Diagnosis from Referral: Unilateral primary osteoarthritis, left knee   Evaluation Date: 2/13/2020  Authorization Period Expiration: 12/31/2020   Plan of Care Expiration: 03/06/2020  Visit # / Visits authorized: 7/ 20    Precautions: Standard    Subjective   Date of onset: Chronic, scheduled for 3/10/2020  History of current condition - [place] reports: She reports she is having a (L) TKA scheduled for March 10th, 2020. She reports she has been having chronic knee pain for 15 years. She has had cortisone injections and 2 synvisc shots in the (R) and 1 in the (L). She reports her (L) knee is more painful than the (R), but the (R) knee is weaker. She reports one fall about 5 years ago when she tripped over the vacuum  cord. She states she has been furniture walking at home. (Instructe patient to use SPC). She reports her MD wants her to strengthen her LEs prior to surgery in March. Virginia reports significant difficulty with stepping up onto the curb, stairs, and walking.     Reassessment: Virginia reports she thinks her knee is straighter and stronger since beginning PT. She states she is glad she came to therapy prior to surgery.      Medical History:   Past Medical History:   Diagnosis Date    Arthritis     Diabetes mellitus     Foot injury     Hypertension        Surgical History:   Dedra Mcclain  has a past surgical history that includes Gallbladder surgery; Tubal ligation; Lung biopsy; Esophagogastroduodenoscopy (N/A, 9/30/2019); Esophageal dilation (N/A, 9/30/2019); Hernia repair (2016); and Appendectomy  (2007).    Medications:   Virginia has a current medication list which includes the following prescription(s): aspirin, atenolol, azathioprine, chlorhexidine, diphenoxylate-atropine 2.5-0.025 mg, fenofibrate, infliximab, lorazepam, metformin, mupirocin, pantoprazole, pravastatin, venlafaxine, and zolpidem.    Allergies:   Review of patient's allergies indicates:   Allergen Reactions    Morphine Hives and Itching        Imaging, X-rays: not in chart yet    Prior Therapy: Yes, for her shoulder. Never for her knee  Social History: Lives with her , H no steps, two walk in showers with a seat, has handicap bathroom set up   Occupation: Disability for Ulcerative Colitis, enjoys fishing, read  Prior Level of Function: Progressively worsening knee pain with ADLs, walking, standing, stairs  Current Level of Function: Cannot perform her ADLs, gardening, yardwork to the point where she is scheduling a (L) TKA    Pain:  Current 0/10, worst 10/10, best 0/10   Location: bilateral knee   Description: Aching and Burning  Aggravating Factors: Standing, Walking and Getting out of bed/chair  Easing Factors: rest and elevation    Pts goals: To strengthen legs pre-surgery     Objective     Observation: Patient in no acute distress    Posture: Stands with (B) increased knee flexion      Range of Motion:   Knee Left active Left Passive Right Active R passive   Flexion 116  NT   Extension -7 NT -2 NT       Lower Extremity Strength  Right LE  Left LE    Knee extension: 4+/5* Knee extension: 5/5   Knee flexion: 5/5 Knee flexion: 5/5   Hip flexion: 5/5 Hip flexion: 5/5   Hip extension:  4+/5 Hip extension: 4+/5   Hip abduction: 4/5 Hip abduction: 4+/5   Hip adduction: 4/5 Hip adduction 4/5   Ankle dorsiflexion: 4+/5 Ankle dorsiflexion: 4+/5   Ankle plantarflexion: 4+/5 Ankle plantarflexion: 4+/5           Special Tests: NT, patient scheduled for TKA and here for strengthening     Function:    - SLS R: 3s  - SLS L: 2s  -  Squat: NT due to pain   - Sit <--> Stand:Independent   - Bed Mobility: Independent        Joint Mobility: Hypomobile  Patellar (B)     Sensation: Intact to LT (B) LEs    Flexibility:   Ely's test: R = Mod limited ; L = Highly limited   Hamstring: R = Mod limited ; L = Mod limited      CMS Impairment/Limitation/Restriction for FOTO Knee Survey    Therapist reviewed FOTO scores for Dedra Mcclain on 3/6/2020.   FOTO documents entered into [employer] - see Media section.    Limitation Score: 68%  Category: Mobility         TREATMENT   See Daily Note     Assessment   Virginia is a 64 y.o. female referred to outpatient Physical Therapy with a medical diagnosis of Unilateral primary osteoarthritis, left knee. Physical exam is consistent with (B) knee OA leading to decreased knee ROM and gait difficulty. Pt is scheduled for strengthening prior to knee replacement surgery. Since beginning PT, Virginia has been seen 7 times since initial evaluation on 02/13/2020. Overall, Virginia has made good, steady progress with her PT treatments and has worked hard towards all of her PT goals as evidenced by subjective and objective improvements. Good improvements with knee ROM and LE strength, but still limited with knee extension and LE strength. She is scheduled for (L) knee TKA next week and is discharged from PT.       Pt prognosis is Good.   Pt will benefit from skilled outpatient Physical Therapy to address the deficits stated above and in the chart below, provide pt/family education, and to maximize pt's level of independence.     Plan of care discussed with patient: Yes  Pt's spiritual, cultural and educational needs considered and patient is agreeable to the plan of care and goals as stated below:     Anticipated Barriers for therapy: Knee pain limiting ability to participate with exercises    Medical Necessity is demonstrated by the following  History  Co-morbidities and personal factors that may impact the plan of care  Co-morbidities:   anxiety, diabetes, high BMI, HTN and ulcerative colitis    Personal Factors:   age  lifestyle     moderate   Examination  Body Structures and Functions, activity limitations and participation restrictions that may impact the plan of care Body Regions:   lower extremities    Body Systems:    ROM  strength  gross coordinated movement  balance  gait    Participation Restrictions:   Cannot exercise, pain with walking, ADLs    Activity limitations:   Learning and applying knowledge  no deficits    General Tasks and Commands  no deficits    Communication  no deficits    Mobility  lifting and carrying objects  walking    Self care  dressing    Domestic Life  shopping  doing house work (cleaning house, washing dishes, laundry)    Interactions/Relationships  no deficits    Life Areas  no deficits    Community and Social Life  community life  recreation and leisure         moderate   Clinical Presentation evolving clinical presentation with changing clinical characteristics moderate   Decision Making/ Complexity Score: moderate     Goals:  Short-Term Goals: 2 weeks  - The patient will be independent with initial home exercise program.(Met)  - The patient will increase strength to at least 4/5 to perform functional mobility including ambulation to prepare for total knee replacement. (Met)    Long-Term Goals: 4 weeks  - Pt to achieve <53% limitation as measured by the FOTO to demonstrate decreased disability. (Progressing, not met)  - The patient will increase strength to at least 4+/5 to perform functional mobility including ascending/descending stairs in preparation for total replacement. (Progressing, not met)  - The patient will increase knee ROM grossly to 5 to 120 to perform ambulation with pain < 3/10. (Progressing, not met)   - The patient will demo safe transfers and ambulation with RW in preparation for TKA. (Met)    Plan   Discharge from PT     Bar Hernandez, PT

## 2020-03-10 PROBLEM — M17.12 PRIMARY OSTEOARTHRITIS OF LEFT KNEE: Status: ACTIVE | Noted: 2020-03-10

## 2020-03-11 PROBLEM — E66.01 CLASS 3 SEVERE OBESITY WITH BODY MASS INDEX (BMI) OF 40.0 TO 44.9 IN ADULT: Status: ACTIVE | Noted: 2020-03-11

## 2020-03-30 ENCOUNTER — CLINICAL SUPPORT (OUTPATIENT)
Dept: REHABILITATION | Facility: HOSPITAL | Age: 65
End: 2020-03-30
Payer: COMMERCIAL

## 2020-03-30 DIAGNOSIS — R29.898 WEAKNESS OF BOTH LOWER EXTREMITIES: ICD-10-CM

## 2020-03-30 DIAGNOSIS — R26.9 GAIT DIFFICULTY: ICD-10-CM

## 2020-03-30 DIAGNOSIS — M25.662 DECREASED RANGE OF MOTION OF LEFT KNEE: ICD-10-CM

## 2020-03-30 PROBLEM — M25.661 DECREASED RANGE OF MOTION OF BOTH KNEES: Status: RESOLVED | Noted: 2020-02-13 | Resolved: 2020-03-30

## 2020-03-30 PROBLEM — G89.29 CHRONIC PAIN OF BOTH KNEES: Status: RESOLVED | Noted: 2020-02-13 | Resolved: 2020-03-30

## 2020-03-30 PROBLEM — M25.561 CHRONIC PAIN OF BOTH KNEES: Status: RESOLVED | Noted: 2020-02-13 | Resolved: 2020-03-30

## 2020-03-30 PROBLEM — M25.562 CHRONIC PAIN OF BOTH KNEES: Status: RESOLVED | Noted: 2020-02-13 | Resolved: 2020-03-30

## 2020-03-30 PROCEDURE — 97110 THERAPEUTIC EXERCISES: CPT | Mod: PO

## 2020-03-30 PROCEDURE — 97161 PT EVAL LOW COMPLEX 20 MIN: CPT | Mod: PO

## 2020-03-30 NOTE — PLAN OF CARE
"[facility]  Physical Therapy Initial Evaluation    Name: Dedra Mcclain  Clinic Number: [id number]    Therapy Diagnosis:   Encounter Diagnoses   Name Primary?    Gait difficulty     Decreased range of motion of left knee     Weakness of both lower extremities      Physician: LA Miramontes MD    Physician Orders: PT Eval and Treat   Medical Diagnosis from Referral: Primary osteoarthritis of left knee   Evaluation Date: 3/30/2020  Authorization Period Expiration: 02/26/2021  Plan of Care Expiration: 05/29/2020  Visit # / Visits authorized: 1/ 1    Time In: 1050  Time Out: 1145  Total Billable Time: 55 minutes    Precautions: Standard    Subjective   Date of onset: s/p Left total knee arthroplasty.  DATE OF PROCEDURE: 03/10/2020   History of current condition - [place] reports: She underwent a (L) TKA on 03/10/2020. She has progressed to the quad cane and is confident with her ambulation. She reports on fall the day after she got home while in the bathroom, but no instability with ambulation. She finished home health PT last Monday, but reports she has not been doing her HEP due to laziness. She reports she has minimal pain with walking and standing, but reports difficulty with sleeping. She states she is down to 1 pain pill per day. "I dont' think I can do this while wearing the mask."     Medical History:   Past Medical History:   Diagnosis Date    Arthritis     Diabetes mellitus     Foot injury     Hypertension     PONV (postoperative nausea and vomiting)     Ulcerative colitis        Surgical History:   Dedra Mcclain  has a past surgical history that includes Gallbladder surgery; Tubal ligation; Lung biopsy; Esophagogastroduodenoscopy (N/A, 9/30/2019); Esophageal dilation (N/A, 9/30/2019); Hernia repair (2016); Appendectomy (2007); and Knee Arthroplasty (Left, 3/10/2020).    Medications:   Virginia has a current medication list which includes the following " prescription(s): aspirin, atenolol, azathioprine, diphenoxylate-atropine 2.5-0.025 mg, fenofibrate, lorazepam, metformin, ondansetron, oxycodone, pantoprazole, pravastatin, tramadol, venlafaxine, and zolpidem.    Allergies:   Review of patient's allergies indicates:   Allergen Reactions    Morphine Hives and Itching        Imaging, Expected postoperative changes from left knee arthroplasty     Prior Therapy: Yes, prehab prior to (L) TKA  Social History: Lives with her , H no steps, two walk in showers with a seat, has handicap bathroom set up   Occupation: Disability for Ulcerative Colitis, enjoys fishing, read  Prior Level of Function: Progressively worsening knee pain with ADLs, walking, standing, stairs  Current Level of Function: Pain with sleeping, standing, needs quad cane to walk    Pain:  Current 3/10, worst 7/10, best 1/10   Location: left knee   Description: Aching, Burning and Deep  Aggravating Factors: Sitting, Standing, Walking, Night Time and Getting out of bed/chair  Easing Factors: massage and pain medication    Pts goals: To be able to walk, to be pain free at time    Objective     Observation: Ambulates with quad cane. Incision healing well    Posture: No significant deficits noted     Range of Motion:   Knee Left active Left Passive Right Active R passive   Flexion 104  NT   Extension 4 NT 5 NT       Lower Extremity Strength  Right LE  Left LE    Knee extension: 4+/5 Knee extension: 4+/5   Knee flexion: 4-/5 Knee flexion: 4-/5   Hip flexion: 4+/5 Hip flexion: 4-/5   Hip extension:  NT Hip extension: NT   Hip abduction: 4/5 Hip abduction: 4-/5   Hip adduction: NT Hip adduction NT   Ankle dorsiflexion: 5/5 Ankle dorsiflexion: 5/5   Ankle plantarflexion: 5/5 Ankle plantarflexion: 5/5       Special Tests: NT s/p (L) TKA       Function:  - Sit <--> Stand: Independent   - Bed Mobility: Independent       Joint Mobility: Mild Hypomobility (L)  Patellar    Sensation: Diminished (L) lateral  knee to LT       CMS Impairment/Limitation/Restriction for FOTO Knee Survey    Therapist reviewed FOTO scores for Dedra Mcclain on 3/30/2020.   FOTO documents entered into [employer] - see Media section.    Limitation Score: 56%  Category: Mobility         TREATMENT   Treatment Time In: 1123  Treatment Time Out: 1145  Total Treatment time separate from Evaluation: 22 minutes    Virginia received therapeutic exercises to develop strength, ROM and flexibility for 22 minutes including:  Passive knee extension stretch x 3 minutes   Quad sets 2 x 10 (3 second hold)   Heel slides x 3 min strap and board   SLR 2 x 10  Upright bike x 8 minutes  Standing gastroc stretch x 3 minutes  Stairs with (B) handrails x 4 steps    Home Exercises and Patient Education Provided    Education provided:   - Role of PT, PT POC  - Importance of performing HEP     Written Home Exercises Provided: yes.  Exercises were reviewed and [place] was able to demonstrate them prior to the end of the session.  [place]ia demonstrated good  understanding of the education provided.     See EMR under Media for exercises provided 3/30/2020.    Assessment   Virginia is a 64 y.o. female referred to outpatient Physical Therapy with a medical diagnosis of Primary osteoarthritis of left knee. Physical exam is consistent with medical diagnosis. Primary impairments include AROM, PROM, joint mobility, strength, balance, soft tissue restrictions, and pain which limits functional mobility. This pt is a good candidate for skilled PT tx and stands to benefit from a combination of manual therapy including joint mobilizations with trigger point/myofacscial release, therapeutic exercise to establish core/joint stability, neuromuscular re-education,and modalities Prn. Patient reported she will not be attending PT because the mask causes her to feel short of breath with exercise and will be compliant with her HEP at home. Stressed importance of HEP and patient verbalized  understanding.The pt has been educated on their dx/POC and consents to further PT tx.      Pt prognosis is Excellent.   Pt will benefit from skilled outpatient Physical Therapy to address the deficits stated above and in the chart below, provide pt/family education, and to maximize pt's level of independence.     Plan of care discussed with patient: Yes  Pt's spiritual, cultural and educational needs considered and patient is agreeable to the plan of care and goals as stated below:     Anticipated Barriers for therapy: Covid -19     Medical Necessity is demonstrated by the following  History  Co-morbidities and personal factors that may impact the plan of care Co-morbidities:   anxiety, diabetes, high BMI, HTN and ulcerative colitis    Personal Factors:   attitudes     moderate   Examination  Body Structures and Functions, activity limitations and participation restrictions that may impact the plan of care Body Regions:   lower extremities    Body Systems:    ROM  strength  gross coordinated movement  balance  gait  transfers    Participation Restrictions:   Limited with ADLs due to pain, requires quad cane for ambulation     Activity limitations:   Learning and applying knowledge  no deficits    General Tasks and Commands  no deficits    Communication  no deficits    Mobility  walking  driving (bike, car, motorcycle)    Self care  dressing    Domestic Life  doing house work (cleaning house, washing dishes, laundry)    Interactions/Relationships  no deficits    Life Areas  no deficits    Community and Social Life  recreation and leisure         moderate   Clinical Presentation stable and uncomplicated low   Decision Making/ Complexity Score: low     Goals:  Short-Term Goals: 4 weeks  - The patient will be independent with initial home exercise program.  - The patient will increase strength to at least 4/5 to perform functional mobility including ascending/descending stairs  - The patient will increase knee ROM  grossly to 0 to 110 degrees to perform ambulation with pain < 4/10.  - The patient will be independent amb with no assistive device on all surfaces for community distances.    Long-Term Goals: 8 weeks  - Pt to achieve <45% limitation as measured by the FOTO to demonstrate decreased disability.  - The patient will be independent with home exercise program and symptom management.  - The patient will increase strength to at least 4+/5 to perform functional mobility including ADLs and hobbies  - The patient will increase knee ROM grossly to 0 to 115 degrees to perform ambulation with pain < 2/10.      Plan   Plan of care Certification: 3/30/2020 to 05/29/2020.    Perform HEP at home, follow up with Outpatient Physical Therapy 2 times weekly for 8 weeks to include the following interventions: Electrical Stimulation prn, Gait Training, Manual Therapy, Moist Heat/ Ice, Neuromuscular Re-ed, Patient Education, Therapeutic Activites and Therapeutic Exercise.     Bar Hernandez, PT

## 2020-05-28 ENCOUNTER — DOCUMENTATION ONLY (OUTPATIENT)
Dept: REHABILITATION | Facility: HOSPITAL | Age: 65
End: 2020-05-28

## 2020-05-28 NOTE — PROGRESS NOTES
Outpatient Therapy Discharge Summary     Name: Dedra Mcclain  Clinic Number: [id number]    Therapy Diagnosis:Gait difficulty, Decreased ROM of left knee; Weakness of both lower extremities   Physician:LA Miramontes MD    Physician Orders: PT Eval and Treat   Medical Diagnosis from Referral: Primary osteoarthritis of left knee   Evaluation Date: 3/30/2020    Date of Last visit: 03/30/2020  Total Visits Received: 1  Cancelled Visits: 0  No Show Visits: 0    Assessment    Goals:   Short-Term Goals: 4 weeks  - The patient will be independent with initial home exercise program.  - The patient will increase strength to at least 4/5 to perform functional mobility including ascending/descending stairs  - The patient will increase knee ROM grossly to 0 to 110 degrees to perform ambulation with pain < 4/10.  - The patient will be independent amb with no assistive device on all surfaces for community distances.     Long-Term Goals: 8 weeks  - Pt to achieve <45% limitation as measured by the FOTO to demonstrate decreased disability.  - The patient will be independent with home exercise program and symptom management.  - The patient will increase strength to at least 4+/5 to perform functional mobility including ADLs and hobbies  - The patient will increase knee ROM grossly to 0 to 115 degrees to perform ambulation with pain < 2/10    Discharge reason: Patient has not attended therapy since 03/30/2020. She reported she will not attend PT if she has to wear a mask due to difficulty breathing    Plan   This patient is discharged from Physical Therapy      Bar Hernandez, PT, DPT  05/28/2020

## 2021-04-29 ENCOUNTER — PATIENT MESSAGE (OUTPATIENT)
Dept: RESEARCH | Facility: HOSPITAL | Age: 66
End: 2021-04-29